# Patient Record
Sex: MALE | Race: ASIAN | URBAN - METROPOLITAN AREA
[De-identification: names, ages, dates, MRNs, and addresses within clinical notes are randomized per-mention and may not be internally consistent; named-entity substitution may affect disease eponyms.]

---

## 2022-04-04 ENCOUNTER — LAB OUTSIDE AN ENCOUNTER (OUTPATIENT)
Dept: URBAN - METROPOLITAN AREA CLINIC 107 | Facility: CLINIC | Age: 47
End: 2022-04-04

## 2022-04-04 ENCOUNTER — OFFICE VISIT (OUTPATIENT)
Dept: URBAN - METROPOLITAN AREA CLINIC 107 | Facility: CLINIC | Age: 47
End: 2022-04-04
Payer: COMMERCIAL

## 2022-04-04 ENCOUNTER — WEB ENCOUNTER (OUTPATIENT)
Dept: URBAN - METROPOLITAN AREA CLINIC 107 | Facility: CLINIC | Age: 47
End: 2022-04-04

## 2022-04-04 VITALS
RESPIRATION RATE: 18 BRPM | TEMPERATURE: 98 F | DIASTOLIC BLOOD PRESSURE: 119 MMHG | HEART RATE: 75 BPM | WEIGHT: 158 LBS | BODY MASS INDEX: 26.33 KG/M2 | HEIGHT: 65 IN | SYSTOLIC BLOOD PRESSURE: 155 MMHG

## 2022-04-04 DIAGNOSIS — K76.0 HEPATIC STEATOSIS: ICD-10-CM

## 2022-04-04 DIAGNOSIS — K82.8 GALLBLADDER SLUDGE: ICD-10-CM

## 2022-04-04 DIAGNOSIS — R10.13 EPIGASTRIC PAIN: ICD-10-CM

## 2022-04-04 DIAGNOSIS — Z12.11 COLON CANCER SCREENING: ICD-10-CM

## 2022-04-04 DIAGNOSIS — R14.0 ABDOMINAL BLOATING: ICD-10-CM

## 2022-04-04 DIAGNOSIS — R79.89 ELEVATED LFTS: ICD-10-CM

## 2022-04-04 PROCEDURE — 99204 OFFICE O/P NEW MOD 45 MIN: CPT | Performed by: NURSE PRACTITIONER

## 2022-04-04 RX ORDER — LOSARTAN POTASSIUM 50 MG/1
1 TABLET TABLET ORAL ONCE A DAY
Status: ACTIVE | COMMUNITY

## 2022-04-04 RX ORDER — AMLODIPINE BESYLATE 5 MG/1
1 TABLET TABLET ORAL ONCE A DAY
Status: ACTIVE | COMMUNITY

## 2022-04-04 NOTE — HPI-TODAY'S VISIT:
47 yo male with a history of hypertension, eleveated LFTs, prediabetes, presenting for evaluation of rectal bleeding.  Labs 3/17/22: Tbili 1.0, Dibil 0.4, ALT 48, AST 23, ALP 71, Ferritin 227.2, Fe 85, TIBC 288, %sat 30, , K 4.4, BUN 15. Crt 0.89, MELONIE negative. Labs 2/14/22: Tbili 2.2, ALT 58. A1c 6.3  US RUQ 3/3/22: Hepatic steatosis. No visualized  focal hepatic lesion or evidence of fibrosis. Gallbladder sludge without cholelithiasis or additional sonographic evidence of acute cholecystitis.  4/4/22: He has complaints of epigastric pain appreciated with stretching out, characterized as a full feeling. He also has some postprandial bloating and abdominal tightness. There is no heartburn, reflux or dysphagia. There is no nausea, or vomiting. He has bowel movements in the morning upon waking, and then 3 or 4 more stools per day after meals. No loose stools. He tells me that his stools are dark brown and black at times. He has had one episode of red blood per rectum. Abdominal tightness is persistent after a bowel movement. He has never had a colonoscopy.

## 2022-04-08 ENCOUNTER — TELEPHONE ENCOUNTER (OUTPATIENT)
Dept: URBAN - METROPOLITAN AREA CLINIC 113 | Facility: CLINIC | Age: 47
End: 2022-04-08

## 2022-04-08 LAB
ACTIN (SMOOTH MUSCLE) ANTIBODY: 6
ALPHA-1-ANTITRYPSIN, SERUM: 113
H PYLORI BREATH TEST: NEGATIVE
HBSAG SCREEN: POSITIVE
HEP A AB, TOTAL: POSITIVE
HEP B CORE AB, TOT: POSITIVE
HEP B SURFACE AB, QUAL: NON REACTIVE
HEP C VIRUS AB: <0.1
INR: 1
LIVER-KIDNEY MICROSOMAL AB: 1.2
MITOCHONDRIAL (M2) ANTIBODY: <20
PHENOTYPE (PI): (no result)
PROTHROMBIN TIME: 10.1

## 2022-04-14 LAB
HBV IU/ML: 60
HEP BE AB: POSITIVE
HEP BE AG: NEGATIVE
LOG10 HBV IU/ML: 1.78
TEST INFORMATION:: (no result)

## 2022-04-20 ENCOUNTER — TELEPHONE ENCOUNTER (OUTPATIENT)
Dept: URBAN - METROPOLITAN AREA CLINIC 107 | Facility: CLINIC | Age: 47
End: 2022-04-20

## 2022-04-22 ENCOUNTER — TELEPHONE ENCOUNTER (OUTPATIENT)
Dept: URBAN - METROPOLITAN AREA CLINIC 107 | Facility: CLINIC | Age: 47
End: 2022-04-22

## 2022-05-09 ENCOUNTER — WEB ENCOUNTER (OUTPATIENT)
Dept: URBAN - METROPOLITAN AREA CLINIC 107 | Facility: CLINIC | Age: 47
End: 2022-05-09

## 2022-05-09 ENCOUNTER — OFFICE VISIT (OUTPATIENT)
Dept: URBAN - METROPOLITAN AREA CLINIC 107 | Facility: CLINIC | Age: 47
End: 2022-05-09
Payer: COMMERCIAL

## 2022-05-09 VITALS
SYSTOLIC BLOOD PRESSURE: 160 MMHG | WEIGHT: 160 LBS | TEMPERATURE: 97.7 F | BODY MASS INDEX: 26.66 KG/M2 | HEIGHT: 65 IN | HEART RATE: 77 BPM | RESPIRATION RATE: 18 BRPM

## 2022-05-09 DIAGNOSIS — K82.8 GALLBLADDER SLUDGE: ICD-10-CM

## 2022-05-09 DIAGNOSIS — Z12.11 COLON CANCER SCREENING: ICD-10-CM

## 2022-05-09 DIAGNOSIS — R10.13 EPIGASTRIC PAIN: ICD-10-CM

## 2022-05-09 DIAGNOSIS — R79.89 ELEVATED LFTS: ICD-10-CM

## 2022-05-09 DIAGNOSIS — K76.0 HEPATIC STEATOSIS: ICD-10-CM

## 2022-05-09 DIAGNOSIS — R14.0 ABDOMINAL BLOATING: ICD-10-CM

## 2022-05-09 DIAGNOSIS — B18.1 CHRONIC VIRAL HEPATITIS B WITHOUT DELTA AGENT AND WITHOUT COMA: ICD-10-CM

## 2022-05-09 PROCEDURE — 99214 OFFICE O/P EST MOD 30 MIN: CPT | Performed by: NURSE PRACTITIONER

## 2022-05-09 RX ORDER — TENOFOVIR ALAFENAMIDE 25 MG/1
1 TABLET WITH FOOD TABLET ORAL ONCE A DAY
Qty: 10 | OUTPATIENT
Start: 2022-05-09 | End: 2022-05-19

## 2022-05-09 RX ORDER — AMLODIPINE BESYLATE 5 MG/1
1 TABLET TABLET ORAL ONCE A DAY
Status: ACTIVE | COMMUNITY

## 2022-05-09 RX ORDER — LOSARTAN POTASSIUM 50 MG/1
1 TABLET TABLET ORAL ONCE A DAY
Status: ACTIVE | COMMUNITY

## 2022-05-09 NOTE — HPI-TODAY'S VISIT:
45 yo male with a history of hypertension, eleveated LFTs, prediabetes, presenting for evaluation of rectal bleeding.  Labs 3/17/22: Tbili 1.0, Dibil 0.4, ALT 48, AST 23, ALP 71, Ferritin 227.2, Fe 85, TIBC 288, %sat 30, , K 4.4, BUN 15. Crt 0.89, MELONIE negative. Labs 2/14/22: Tbili 2.2, ALT 58. A1c 6.3  US RUQ 3/3/22: Hepatic steatosis. No visualized  focal hepatic lesion or evidence of fibrosis. Gallbladder sludge without cholelithiasis or additional sonographic evidence of acute cholecystitis.  4/4/22: He has complaints of epigastric pain appreciated with stretching out, characterized as a full feeling. He also has some postprandial bloating and abdominal tightness. There is no heartburn, reflux or dysphagia. There is no nausea, or vomiting. He has bowel movements in the morning upon waking, and then 3 or 4 more stools per day after meals. No loose stools. He tells me that his stools are dark brown and black at times. He has had one episode of red blood per rectum. Abdominal tightness is persistent after a bowel movement. He has never had a colonoscopy.  Labs 4/6/22: HCV Ab negative, HBV sAg positive, HBV core Ab positive, HBV sAb negative, HAV total Ab positive. ASMA, AMA, A1At, LKM Ab are all negative. H. pylori breath test negative. Labs 4/13/22: HBV DNA 60 IU/mL log10 HBV 1.778, Be Ag negative, Be Ab positive. Labs 5/2/22: WBC 7.3, Hg 15.1, Plt 231, Na 139. K 4.2, BUN 12, Crt 0.85, Tbili 0.9, AST 22, ALT 45, ALP 74, Alb 4.5  CTAP w/ con 4/22/22: hepatic steatosis.  Discussed with Dr. Campbell regarding HBV infection, we can offer treatment or monitor LFTs in another 3 months.  His EGD is scheduled for 6/1/22.  He is doing well currently. No abdominal pain, nausea, vomiting or abdominal pain. Bowels are moving daily.

## 2022-05-10 ENCOUNTER — TELEPHONE ENCOUNTER (OUTPATIENT)
Dept: URBAN - METROPOLITAN AREA CLINIC 113 | Facility: CLINIC | Age: 47
End: 2022-05-10

## 2022-05-10 RX ORDER — TENOFOVIR ALAFENAMIDE 25 MG/1
1 TABLET WITH FOOD TABLET ORAL ONCE A DAY
Qty: 90 | Refills: 3 | OUTPATIENT
Start: 2022-05-11 | End: 2023-05-06

## 2022-05-20 ENCOUNTER — TELEPHONE ENCOUNTER (OUTPATIENT)
Dept: URBAN - METROPOLITAN AREA CLINIC 107 | Facility: CLINIC | Age: 47
End: 2022-05-20

## 2022-05-20 RX ORDER — TENOFOVIR ALAFENAMIDE 25 MG/1
1 TABLET WITH FOOD TABLET ORAL ONCE A DAY
Qty: 90 | Refills: 3

## 2022-05-24 ENCOUNTER — TELEPHONE ENCOUNTER (OUTPATIENT)
Dept: URBAN - METROPOLITAN AREA CLINIC 113 | Facility: CLINIC | Age: 47
End: 2022-05-24

## 2022-05-27 ENCOUNTER — TELEPHONE ENCOUNTER (OUTPATIENT)
Dept: URBAN - METROPOLITAN AREA CLINIC 113 | Facility: CLINIC | Age: 47
End: 2022-05-27

## 2022-05-27 ENCOUNTER — WEB ENCOUNTER (OUTPATIENT)
Dept: URBAN - METROPOLITAN AREA SURGERY CENTER 25 | Facility: SURGERY CENTER | Age: 47
End: 2022-05-27

## 2022-05-27 RX ORDER — TENOFOVIR DISOPROXIL FUMARATE 300 MG
1 TABLET TABLET ORAL ONCE A DAY
Qty: 90 | Refills: 3 | OUTPATIENT
Start: 2022-05-27

## 2022-06-01 ENCOUNTER — CLAIMS CREATED FROM THE CLAIM WINDOW (OUTPATIENT)
Dept: URBAN - METROPOLITAN AREA CLINIC 4 | Facility: CLINIC | Age: 47
End: 2022-06-01
Payer: COMMERCIAL

## 2022-06-01 ENCOUNTER — OFFICE VISIT (OUTPATIENT)
Dept: URBAN - METROPOLITAN AREA SURGERY CENTER 25 | Facility: SURGERY CENTER | Age: 47
End: 2022-06-01
Payer: COMMERCIAL

## 2022-06-01 DIAGNOSIS — K29.40 CHRONIC ATROPHIC GASTRITIS WITHOUT BLEEDING: ICD-10-CM

## 2022-06-01 DIAGNOSIS — R10.13 ABDOMINAL DISCOMFORT, EPIGASTRIC: ICD-10-CM

## 2022-06-01 DIAGNOSIS — K31.A11 GASTRIC INTESTINAL METAPLASIA WITHOUT DYSPLASIA, INVOLVING THE ANTRUM: ICD-10-CM

## 2022-06-01 DIAGNOSIS — K29.60 ADENOPAPILLOMATOSIS GASTRICA: ICD-10-CM

## 2022-06-01 PROCEDURE — 88305 TISSUE EXAM BY PATHOLOGIST: CPT | Performed by: PATHOLOGY

## 2022-06-01 PROCEDURE — G8907 PT DOC NO EVENTS ON DISCHARG: HCPCS | Performed by: INTERNAL MEDICINE

## 2022-06-01 PROCEDURE — 43239 EGD BIOPSY SINGLE/MULTIPLE: CPT | Performed by: INTERNAL MEDICINE

## 2022-06-01 PROCEDURE — 88342 IMHCHEM/IMCYTCHM 1ST ANTB: CPT | Performed by: PATHOLOGY

## 2022-06-01 RX ORDER — TENOFOVIR ALAFENAMIDE 25 MG/1
1 TABLET WITH FOOD TABLET ORAL ONCE A DAY
Qty: 90 | Refills: 3 | Status: ACTIVE | COMMUNITY

## 2022-06-01 RX ORDER — LOSARTAN POTASSIUM 50 MG/1
1 TABLET TABLET ORAL ONCE A DAY
Status: ACTIVE | COMMUNITY

## 2022-06-01 RX ORDER — AMLODIPINE BESYLATE 5 MG/1
1 TABLET TABLET ORAL ONCE A DAY
Status: ACTIVE | COMMUNITY

## 2022-06-01 RX ORDER — TENOFOVIR DISOPROXIL FUMARATE 300 MG
1 TABLET TABLET ORAL ONCE A DAY
Qty: 90 | Refills: 3 | Status: ACTIVE | COMMUNITY
Start: 2022-05-27

## 2022-06-24 ENCOUNTER — TELEPHONE ENCOUNTER (OUTPATIENT)
Dept: URBAN - METROPOLITAN AREA CLINIC 96 | Facility: CLINIC | Age: 47
End: 2022-06-24

## 2022-07-22 ENCOUNTER — LAB OUTSIDE AN ENCOUNTER (OUTPATIENT)
Dept: URBAN - METROPOLITAN AREA CLINIC 113 | Facility: CLINIC | Age: 47
End: 2022-07-22

## 2022-07-22 ENCOUNTER — OFFICE VISIT (OUTPATIENT)
Dept: URBAN - METROPOLITAN AREA CLINIC 113 | Facility: CLINIC | Age: 47
End: 2022-07-22
Payer: COMMERCIAL

## 2022-07-22 VITALS
WEIGHT: 158 LBS | SYSTOLIC BLOOD PRESSURE: 155 MMHG | HEART RATE: 72 BPM | DIASTOLIC BLOOD PRESSURE: 105 MMHG | BODY MASS INDEX: 26.33 KG/M2 | TEMPERATURE: 97.7 F | HEIGHT: 65 IN

## 2022-07-22 DIAGNOSIS — B18.1 CHRONIC VIRAL HEPATITIS B WITHOUT DELTA AGENT AND WITHOUT COMA: ICD-10-CM

## 2022-07-22 DIAGNOSIS — R79.89 ELEVATED LFTS: ICD-10-CM

## 2022-07-22 DIAGNOSIS — R10.13 EPIGASTRIC PAIN: ICD-10-CM

## 2022-07-22 DIAGNOSIS — R76.8 HEPATITIS B SURFACE ANTIGEN POSITIVE: ICD-10-CM

## 2022-07-22 DIAGNOSIS — Z12.11 COLON CANCER SCREENING: ICD-10-CM

## 2022-07-22 PROCEDURE — 99214 OFFICE O/P EST MOD 30 MIN: CPT | Performed by: INTERNAL MEDICINE

## 2022-07-22 RX ORDER — AMLODIPINE BESYLATE 5 MG/1
1 TABLET TABLET ORAL ONCE A DAY
Status: ACTIVE | COMMUNITY

## 2022-07-22 RX ORDER — TENOFOVIR DISOPROXIL FUMARATE 300 MG
1 TABLET TABLET ORAL ONCE A DAY
OUTPATIENT
Start: 2022-05-27

## 2022-07-22 RX ORDER — LOSARTAN POTASSIUM 50 MG/1
1 TABLET TABLET ORAL ONCE A DAY
Status: ACTIVE | COMMUNITY

## 2022-07-22 RX ORDER — TENOFOVIR DISOPROXIL FUMARATE 300 MG
1 TABLET TABLET ORAL ONCE A DAY
Qty: 90 | Refills: 3 | Status: ACTIVE | COMMUNITY
Start: 2022-05-27

## 2022-07-22 RX ORDER — TENOFOVIR ALAFENAMIDE 25 MG/1
1 TABLET WITH FOOD TABLET ORAL ONCE A DAY
Qty: 90 | Refills: 3 | Status: ON HOLD | COMMUNITY

## 2022-07-22 NOTE — HPI-TODAY'S VISIT:
Patient returns today in follow-up.  He has had several issues.  He is having some abdominal pain.  EGD was performed showing gastritis.  Biopsies reviewed with him which were negative.  Overall his abdominal discomfort is better.  Occasionally feels some tightness on the right side.  CT scan in April showed some fatty liver but no mass or other acute process.  He has a history of chronic hepatitis B.  He did not therapy.  We tried to get Vemlidy started but his insurance denied it.  They preferred vireadn For some unknown reason.  He has been on this now for a month and a half.  No side effects.  He has never had a colonoscopy.  There is no family history of colon cancer.

## 2022-10-04 ENCOUNTER — TELEPHONE ENCOUNTER (OUTPATIENT)
Dept: URBAN - METROPOLITAN AREA CLINIC 113 | Facility: CLINIC | Age: 47
End: 2022-10-04

## 2022-10-04 ENCOUNTER — OFFICE VISIT (OUTPATIENT)
Dept: URBAN - METROPOLITAN AREA SURGERY CENTER 25 | Facility: SURGERY CENTER | Age: 47
End: 2022-10-04
Payer: COMMERCIAL

## 2022-10-04 ENCOUNTER — CLAIMS CREATED FROM THE CLAIM WINDOW (OUTPATIENT)
Dept: URBAN - METROPOLITAN AREA CLINIC 4 | Facility: CLINIC | Age: 47
End: 2022-10-04
Payer: COMMERCIAL

## 2022-10-04 DIAGNOSIS — Z12.11 COLON CANCER SCREENING: ICD-10-CM

## 2022-10-04 DIAGNOSIS — K63.5 HYPERPLASTIC POLYP OF SIGMOID COLON: ICD-10-CM

## 2022-10-04 DIAGNOSIS — K63.89 OTHER SPECIFIED DISEASES OF INTESTINE: ICD-10-CM

## 2022-10-04 PROCEDURE — 88305 TISSUE EXAM BY PATHOLOGIST: CPT | Performed by: PATHOLOGY

## 2022-10-04 PROCEDURE — 45385 COLONOSCOPY W/LESION REMOVAL: CPT | Performed by: INTERNAL MEDICINE

## 2022-10-04 PROCEDURE — G8907 PT DOC NO EVENTS ON DISCHARG: HCPCS | Performed by: INTERNAL MEDICINE

## 2022-10-04 RX ORDER — LOSARTAN POTASSIUM 50 MG/1
1 TABLET TABLET ORAL ONCE A DAY
Status: ACTIVE | COMMUNITY

## 2022-10-04 RX ORDER — AMLODIPINE BESYLATE 5 MG/1
1 TABLET TABLET ORAL ONCE A DAY
Status: ACTIVE | COMMUNITY

## 2022-10-04 RX ORDER — TENOFOVIR ALAFENAMIDE 25 MG/1
1 TABLET WITH FOOD TABLET ORAL ONCE A DAY
Qty: 90 | Refills: 3 | Status: ON HOLD | COMMUNITY

## 2022-10-04 RX ORDER — TENOFOVIR DISOPROXIL FUMARATE 300 MG
1 TABLET TABLET ORAL ONCE A DAY
Status: ACTIVE | COMMUNITY
Start: 2022-05-27

## 2022-10-19 LAB
A/G RATIO: 1.7
ABSOLUTE BASOPHILS: 43
ABSOLUTE EOSINOPHILS: 461
ABSOLUTE LYMPHOCYTES: 2030
ABSOLUTE MONOCYTES: 504
ABSOLUTE NEUTROPHILS: 4162
ALBUMIN: 4.5
ALKALINE PHOSPHATASE: 63
ALT (SGPT): 50
AST (SGOT): 22
BASOPHILS: 0.6
BILIRUBIN, TOTAL: 1.1
BUN/CREATININE RATIO: (no result)
BUN: 12
CALCIUM: 8.9
CARBON DIOXIDE, TOTAL: 29
CHLORIDE: 103
CREATININE: 0.93
EGFR: 101
EOSINOPHILS: 6.4
GLOBULIN, TOTAL: 2.6
GLUCOSE: 131
HEMATOCRIT: 48.3
HEMOGLOBIN: 15.9
HEP BE AB: REACTIVE
HEP BE AG: (no result)
HEPATITIS B VIRUS DNA: (no result)
HEPATITIS B VIRUS DNA: (no result)
HEPATITIS D ANTIBODY,: NEGATIVE
INR: 1
LYMPHOCYTES: 28.2
MCH: 29.6
MCHC: 32.9
MCV: 89.8
MONOCYTES: 7
MPV: 10.1
NEUTROPHILS: 57.8
PLATELET COUNT: 218
POTASSIUM: 4.2
PROTEIN, TOTAL: 7.1
PT: 10.3
RDW: 11.8
RED BLOOD CELL COUNT: 5.38
SODIUM: 140
WHITE BLOOD CELL COUNT: 7.2

## 2022-10-25 ENCOUNTER — OFFICE VISIT (OUTPATIENT)
Dept: URBAN - METROPOLITAN AREA CLINIC 113 | Facility: CLINIC | Age: 47
End: 2022-10-25
Payer: COMMERCIAL

## 2022-10-25 VITALS
DIASTOLIC BLOOD PRESSURE: 103 MMHG | HEART RATE: 75 BPM | BODY MASS INDEX: 25.99 KG/M2 | TEMPERATURE: 97.7 F | HEIGHT: 65 IN | RESPIRATION RATE: 16 BRPM | SYSTOLIC BLOOD PRESSURE: 160 MMHG | WEIGHT: 156 LBS

## 2022-10-25 DIAGNOSIS — B18.1 CHRONIC VIRAL HEPATITIS B WITHOUT DELTA AGENT AND WITHOUT COMA: ICD-10-CM

## 2022-10-25 DIAGNOSIS — K63.5 HYPERPLASTIC COLONIC POLYP, UNSPECIFIED PART OF COLON: ICD-10-CM

## 2022-10-25 PROBLEM — 733657002: Status: ACTIVE | Noted: 2022-10-25

## 2022-10-25 PROBLEM — 197321007: Status: ACTIVE | Noted: 2022-04-04

## 2022-10-25 PROBLEM — 721691004: Status: ACTIVE | Noted: 2022-10-25

## 2022-10-25 PROBLEM — 61977001: Status: ACTIVE | Noted: 2022-05-09

## 2022-10-25 PROCEDURE — 99213 OFFICE O/P EST LOW 20 MIN: CPT | Performed by: NURSE PRACTITIONER

## 2022-10-25 RX ORDER — LOSARTAN POTASSIUM 50 MG/1
1 TABLET TABLET ORAL ONCE A DAY
Status: ACTIVE | COMMUNITY

## 2022-10-25 RX ORDER — TENOFOVIR ALAFENAMIDE 25 MG/1
1 TABLET WITH FOOD TABLET ORAL ONCE A DAY
Qty: 90 | Refills: 3 | Status: ON HOLD | COMMUNITY

## 2022-10-25 RX ORDER — TENOFOVIR DISOPROXIL FUMARATE 300 MG
1 TABLET TABLET ORAL ONCE A DAY
OUTPATIENT
Start: 2022-05-27

## 2022-10-25 RX ORDER — TENOFOVIR DISOPROXIL FUMARATE 300 MG
1 TABLET TABLET ORAL ONCE A DAY
Status: ACTIVE | COMMUNITY
Start: 2022-05-27

## 2022-10-25 RX ORDER — AMLODIPINE BESYLATE 5 MG/1
1 TABLET TABLET ORAL ONCE A DAY
Status: ACTIVE | COMMUNITY

## 2022-10-25 NOTE — HPI-TODAY'S VISIT:
47 year old male patient of Dr. Campbell here for a 3 week follow up after his colonsocopy.    Per Last note:  Patient returns today in follow-up.  He has had several issues.  He is having some abdominal pain.  EGD was performed showing gastritis.  Biopsies reviewed with him which were negative.  Overall his abdominal discomfort is better.  Occasionally feels some tightness on the right side.  CT scan in April showed some fatty liver but no mass or other acute process. He has a history of chronic hepatitis B.  He did not therapy.  We tried to get Vemlidy started but his insurance denied it.  They preferred viread for some unknown reason.  He has been on this now for a month and a half.  No side effects. started a few months ago.    After his EGD, cholecystectomy was recommended if symptoms persist and EGD path negative due to prior gallbladder sludge.  Viread started 5/27/22.  Reported some side effects of Viread on 10/4/22- dizziness with movement.  No syncope or near-syncope.  Labs were ordered.  He also reports a scalp rash that itches for the past few months.  Currently on a cream that helps.    On interview today he reports the dizziness has improved as well as the cream for the scalp rash.  His wife who is translating couldn't find the name of the cream he is using.   Patient is without any abdominal complaints. No dysphagia, heartburn, regurgitation, unintentional weight loss, nausea, vomiting, hematemesis or melena. Bowels are moving regularly without blood per rectum.   Labs 8/16/2022.  CBC:Normal.  Vitamin B12 normal.  TSH and FT4 normal.  Vitamin D low end of normal at 30.  CMP: Glucose 152, ALT 86  EGD 6/1/2022 revealed gastritis.  Antrum biopsy revealed focal intestinal metaplasia and changes suggestive of treated H. pylori gastritis. Colonoscopy 10/4/2022.  5 mm polyp rectosigmoid colon diverticulosis of sigmoid and descending colon. Poly Bx: Hyperplasctic.

## 2022-10-26 ENCOUNTER — TELEPHONE ENCOUNTER (OUTPATIENT)
Dept: URBAN - METROPOLITAN AREA CLINIC 113 | Facility: CLINIC | Age: 47
End: 2022-10-26

## 2023-04-07 ENCOUNTER — OFFICE VISIT (OUTPATIENT)
Dept: URBAN - METROPOLITAN AREA CLINIC 113 | Facility: CLINIC | Age: 48
End: 2023-04-07
Payer: COMMERCIAL

## 2023-04-07 ENCOUNTER — LAB OUTSIDE AN ENCOUNTER (OUTPATIENT)
Dept: URBAN - METROPOLITAN AREA CLINIC 113 | Facility: CLINIC | Age: 48
End: 2023-04-07

## 2023-04-07 VITALS
BODY MASS INDEX: 25.49 KG/M2 | TEMPERATURE: 97.5 F | SYSTOLIC BLOOD PRESSURE: 176 MMHG | HEART RATE: 70 BPM | HEIGHT: 65 IN | DIASTOLIC BLOOD PRESSURE: 113 MMHG | WEIGHT: 153 LBS | RESPIRATION RATE: 18 BRPM

## 2023-04-07 DIAGNOSIS — B18.1 CHRONIC VIRAL HEPATITIS B WITHOUT DELTA AGENT AND WITHOUT COMA: ICD-10-CM

## 2023-04-07 PROCEDURE — 99213 OFFICE O/P EST LOW 20 MIN: CPT | Performed by: NURSE PRACTITIONER

## 2023-04-07 RX ORDER — TENOFOVIR DISOPROXIL FUMARATE 300 MG
1 TABLET TABLET ORAL ONCE A DAY
Status: ACTIVE | COMMUNITY
Start: 2022-05-27

## 2023-04-07 RX ORDER — TENOFOVIR ALAFENAMIDE 25 MG/1
1 TABLET WITH FOOD TABLET ORAL ONCE A DAY
Qty: 90 | Refills: 3 | Status: ON HOLD | COMMUNITY

## 2023-04-07 RX ORDER — LOSARTAN POTASSIUM 50 MG/1
1 TABLET TABLET ORAL ONCE A DAY
Status: ACTIVE | COMMUNITY

## 2023-04-07 RX ORDER — TENOFOVIR DISOPROXIL FUMARATE 300 MG
1 TABLET TABLET ORAL ONCE A DAY
OUTPATIENT
Start: 2022-05-27

## 2023-04-07 RX ORDER — AMLODIPINE BESYLATE 5 MG/1
1 TABLET TABLET ORAL ONCE A DAY
Status: ACTIVE | COMMUNITY

## 2023-04-07 NOTE — HPI-TODAY'S VISIT:
46 yo with a history of chronic viral hepatitis B and hyperplastic colon polyps, presenting for 6 month follow up.  He was seen in the office in October 2022. Hepatits B viral load was unremarkable.  He is doing well. He is not having any trouble with dizziness or rash. He is taking Viread 300 mg daily. There is no dysphagia, heartburn, abdominal pain, nausea or vomiting, or changes in bowel habits. No blood per rectum. No jaundice or icterus.

## 2023-04-12 LAB
A/G RATIO: 1.7
ABSOLUTE BASOPHILS: 31
ABSOLUTE EOSINOPHILS: 424
ABSOLUTE LYMPHOCYTES: 1679
ABSOLUTE MONOCYTES: 477
ABSOLUTE NEUTROPHILS: 5090
AFP, SERUM, TUMOR MARKER: 4.1
ALBUMIN: 4.7
ALKALINE PHOSPHATASE: 76
ALT (SGPT): 37
AST (SGOT): 20
BASOPHILS: 0.4
BILIRUBIN, TOTAL: 1.3
BUN/CREATININE RATIO: (no result)
BUN: 11
CALCIUM: 9.2
CARBON DIOXIDE, TOTAL: 27
CHLORIDE: 102
CREATININE: 0.93
EGFR: 101
EOSINOPHILS: 5.5
GLOBULIN, TOTAL: 2.7
GLUCOSE: 112
HEMATOCRIT: 49.5
HEMOGLOBIN: 16.3
HEPATITIS B VIRUS DNA: (no result)
HEPATITIS B VIRUS DNA: (no result)
LYMPHOCYTES: 21.8
MCH: 29.2
MCHC: 32.9
MCV: 88.7
MONOCYTES: 6.2
MPV: 10
NEUTROPHILS: 66.1
PLATELET COUNT: 225
POTASSIUM: 4.4
PROTEIN, TOTAL: 7.4
RDW: 11.7
RED BLOOD CELL COUNT: 5.58
SODIUM: 138
WHITE BLOOD CELL COUNT: 7.7

## 2023-05-25 ENCOUNTER — ERX REFILL RESPONSE (OUTPATIENT)
Dept: URBAN - METROPOLITAN AREA CLINIC 113 | Facility: CLINIC | Age: 48
End: 2023-05-25

## 2023-05-25 RX ORDER — TENOFOVIR DISOPROXIL FUMARATE 300 MG/1
TAKE 1 TABLET BY MOUTH EVERY DAY FOR 90 DAYS TABLET, FILM COATED ORAL
Qty: 90 TABLET | Refills: 3 | OUTPATIENT

## 2023-05-25 RX ORDER — TENOFOVIR DISOPROXIL FUMARATE 300 MG
1 TABLET TABLET ORAL ONCE A DAY
OUTPATIENT

## 2024-02-28 ENCOUNTER — OV EP (OUTPATIENT)
Dept: URBAN - METROPOLITAN AREA CLINIC 113 | Facility: CLINIC | Age: 49
End: 2024-02-28

## 2024-02-28 ENCOUNTER — LAB (OUTPATIENT)
Dept: URBAN - METROPOLITAN AREA CLINIC 113 | Facility: CLINIC | Age: 49
End: 2024-02-28

## 2024-02-28 VITALS
RESPIRATION RATE: 16 BRPM | HEART RATE: 81 BPM | SYSTOLIC BLOOD PRESSURE: 155 MMHG | TEMPERATURE: 97.3 F | DIASTOLIC BLOOD PRESSURE: 102 MMHG | HEIGHT: 65 IN | BODY MASS INDEX: 25.83 KG/M2 | WEIGHT: 155 LBS

## 2024-02-28 DIAGNOSIS — B18.1 CHRONIC VIRAL HEPATITIS B WITHOUT DELTA AGENT AND WITHOUT COMA: ICD-10-CM

## 2024-02-28 PROCEDURE — 99213 OFFICE O/P EST LOW 20 MIN: CPT | Performed by: NURSE PRACTITIONER

## 2024-02-28 RX ORDER — LOSARTAN POTASSIUM 50 MG/1
1 TABLET TABLET ORAL ONCE A DAY
Status: ACTIVE | COMMUNITY

## 2024-02-28 RX ORDER — TENOFOVIR DISOPROXIL FUMARATE 300 MG
1 TABLET TABLET ORAL ONCE A DAY
Qty: 90 TABLET | Refills: 3 | OUTPATIENT

## 2024-02-28 RX ORDER — TENOFOVIR ALAFENAMIDE 25 MG/1
1 TABLET WITH FOOD TABLET ORAL ONCE A DAY
Qty: 90 | Refills: 3 | Status: ON HOLD | COMMUNITY

## 2024-02-28 RX ORDER — AMLODIPINE BESYLATE 5 MG/1
1 TABLET TABLET ORAL ONCE A DAY
Status: ACTIVE | COMMUNITY

## 2024-02-28 RX ORDER — TENOFOVIR DISOPROXIL FUMARATE 300 MG/1
TAKE 1 TABLET BY MOUTH EVERY DAY FOR 90 DAYS TABLET, FILM COATED ORAL
Qty: 90 TABLET | Refills: 3 | Status: ACTIVE | COMMUNITY

## 2024-02-28 NOTE — HPI-TODAY'S VISIT:
47 yo with a history of chronic viral hepatitis B and hyperplastic colon polyps, presenting for 6 month follow up.  He was seen in the office in October 2022. Hepatits B viral load was unremarkable.  He is doing well. He is not having any trouble with dizziness or rash. He is taking tenofovir 300 mg daily. There is no dysphagia, heartburn, abdominal pain, nausea or vomiting, or changes in bowel habits. No blood per rectum. No jaundice or icterus.

## 2024-02-29 LAB
A/G RATIO: 1.7
ABSOLUTE BASOPHILS: 30
ABSOLUTE EOSINOPHILS: 451
ABSOLUTE LYMPHOCYTES: 1621
ABSOLUTE MONOCYTES: 533
ABSOLUTE NEUTROPHILS: 4766
AFP, SERUM, TUMOR MARKER: 2.6
ALBUMIN: 4.5
ALKALINE PHOSPHATASE: 70
ALT (SGPT): 35
AST (SGOT): 20
BASOPHILS: 0.4
BILIRUBIN, TOTAL: 0.6
BUN/CREATININE RATIO: (no result)
BUN: 14
CALCIUM: 8.9
CARBON DIOXIDE, TOTAL: 29
CHLORIDE: 102
CREATININE: 0.92
EGFR: 101
EOSINOPHILS: 6.1
GLOBULIN, TOTAL: 2.7
GLUCOSE: 128
HEMATOCRIT: 47
HEMOGLOBIN: 15.8
LYMPHOCYTES: 21.9
MCH: 30.5
MCHC: 33.6
MCV: 90.7
MONOCYTES: 7.2
MPV: 10.2
NEUTROPHILS: 64.4
PLATELET COUNT: 243
POTASSIUM: 4.4
PROTEIN, TOTAL: 7.2
RDW: 11.8
RED BLOOD CELL COUNT: 5.18
SODIUM: 139
WHITE BLOOD CELL COUNT: 7.4

## 2025-03-21 ENCOUNTER — ERX REFILL RESPONSE (OUTPATIENT)
Dept: URBAN - METROPOLITAN AREA CLINIC 113 | Facility: CLINIC | Age: 50
End: 2025-03-21

## 2025-03-21 RX ORDER — TENOFOVIR DISOPROXIL FUMARATE 300 MG/1
TAKE 1 TABLET BY MOUTH EVERY DAY FOR 90 DAYS TABLET, FILM COATED ORAL
Qty: 30 TABLET | Refills: 11 | OUTPATIENT

## 2025-03-21 RX ORDER — TENOFOVIR DISOPROXIL FUMARATE 300 MG
1 TABLET TABLET ORAL ONCE A DAY
Qty: 90 TABLET | Refills: 3 | OUTPATIENT